# Patient Record
Sex: FEMALE | Race: WHITE | NOT HISPANIC OR LATINO | URBAN - METROPOLITAN AREA
[De-identification: names, ages, dates, MRNs, and addresses within clinical notes are randomized per-mention and may not be internally consistent; named-entity substitution may affect disease eponyms.]

---

## 2024-05-22 ENCOUNTER — APPOINTMENT (OUTPATIENT)
Dept: OBSTETRICS AND GYNECOLOGY | Facility: CLINIC | Age: 39
End: 2024-05-22
Payer: COMMERCIAL

## 2024-06-11 ENCOUNTER — APPOINTMENT (OUTPATIENT)
Dept: PRIMARY CARE | Facility: CLINIC | Age: 39
End: 2024-06-11
Payer: COMMERCIAL

## 2024-06-25 ENCOUNTER — HOSPITAL ENCOUNTER (OUTPATIENT)
Dept: RADIOLOGY | Facility: EXTERNAL LOCATION | Age: 39
Discharge: HOME | End: 2024-06-25

## 2024-06-25 ENCOUNTER — APPOINTMENT (OUTPATIENT)
Dept: PRIMARY CARE | Facility: CLINIC | Age: 39
End: 2024-06-25
Payer: COMMERCIAL

## 2024-06-25 VITALS
TEMPERATURE: 97.2 F | BODY MASS INDEX: 29.81 KG/M2 | HEART RATE: 66 BPM | DIASTOLIC BLOOD PRESSURE: 82 MMHG | SYSTOLIC BLOOD PRESSURE: 125 MMHG | HEIGHT: 68 IN | OXYGEN SATURATION: 96 % | WEIGHT: 196.7 LBS

## 2024-06-25 DIAGNOSIS — Z23 ENCOUNTER FOR IMMUNIZATION: ICD-10-CM

## 2024-06-25 DIAGNOSIS — Z30.44 ENCOUNTER FOR SURVEILLANCE OF VAGINAL RING HORMONAL CONTRACEPTIVE DEVICE: ICD-10-CM

## 2024-06-25 DIAGNOSIS — Z12.31 BREAST CANCER SCREENING BY MAMMOGRAM: ICD-10-CM

## 2024-06-25 DIAGNOSIS — Z00.00 ANNUAL PHYSICAL EXAM: Primary | ICD-10-CM

## 2024-06-25 DIAGNOSIS — D72.819 LEUKOPENIA, UNSPECIFIED TYPE: ICD-10-CM

## 2024-06-25 PROBLEM — L71.9 ROSACEA, UNSPECIFIED: Status: ACTIVE | Noted: 2021-09-15

## 2024-06-25 PROBLEM — E78.5 HYPERLIPIDEMIA: Status: ACTIVE | Noted: 2024-06-25

## 2024-06-25 PROBLEM — D22.5 MELANOCYTIC NEVI OF TRUNK: Status: RESOLVED | Noted: 2021-09-15 | Resolved: 2024-06-25

## 2024-06-25 LAB
NON-UH HIE A/G RATIO: 1.3
NON-UH HIE ALB: 4.1 G/DL (ref 3.4–5)
NON-UH HIE ALK PHOS: 84 UNIT/L (ref 45–117)
NON-UH HIE BILIRUBIN, TOTAL: 0.8 MG/DL (ref 0.3–1.2)
NON-UH HIE BUN/CREAT RATIO: 13.8
NON-UH HIE BUN: 11 MG/DL (ref 9–23)
NON-UH HIE CALCIUM: 9.4 MG/DL (ref 8.7–10.4)
NON-UH HIE CALCULATED LDL CHOLESTEROL: 149 MG/DL (ref 60–130)
NON-UH HIE CALCULATED OSMOLALITY: 279 MOSM/KG (ref 275–295)
NON-UH HIE CHLORIDE: 107 MMOL/L (ref 98–107)
NON-UH HIE CHOLESTEROL: 222 MG/DL (ref 100–200)
NON-UH HIE CO2, VENOUS: 28 MMOL/L (ref 20–31)
NON-UH HIE CREATININE: 0.8 MG/DL (ref 0.5–0.8)
NON-UH HIE GFR AA: >60
NON-UH HIE GLOBULIN: 3.2 G/DL
NON-UH HIE GLOMERULAR FILTRATION RATE: >60 ML/MIN/1.73M?
NON-UH HIE GLUCOSE: 94 MG/DL (ref 74–106)
NON-UH HIE GOT: 17 UNIT/L (ref 15–37)
NON-UH HIE GPT: 12 UNIT/L (ref 10–49)
NON-UH HIE HCT: 39.5 % (ref 36–46)
NON-UH HIE HDL CHOLESTEROL: 54 MG/DL (ref 40–60)
NON-UH HIE HGB: 13.5 G/DL (ref 12–16)
NON-UH HIE INSTR WBC ND: 3.9
NON-UH HIE K: 4.3 MMOL/L (ref 3.5–5.1)
NON-UH HIE MCH: 31.6 PG (ref 27–34)
NON-UH HIE MCHC: 34.2 G/DL (ref 32–37)
NON-UH HIE MCV: 92.6 FL (ref 80–100)
NON-UH HIE MPV: 9 FL (ref 7.4–10.4)
NON-UH HIE NA: 140 MMOL/L (ref 135–145)
NON-UH HIE PLATELET: 193 X10 (ref 150–450)
NON-UH HIE RBC: 4.27 X10 (ref 4.2–5.4)
NON-UH HIE RDW: 12.8 % (ref 11.5–14.5)
NON-UH HIE TOTAL CHOL/HDL CHOL RATIO: 4.1
NON-UH HIE TOTAL PROTEIN: 7.3 G/DL (ref 5.7–8.2)
NON-UH HIE TRIGLYCERIDES: 96 MG/DL (ref 30–150)
NON-UH HIE VIT D 25: 27 NG/ML
NON-UH HIE WBC: 3.9 X10 (ref 4.5–11)

## 2024-06-25 PROCEDURE — 99396 PREV VISIT EST AGE 40-64: CPT | Performed by: NURSE PRACTITIONER

## 2024-06-25 PROCEDURE — 90715 TDAP VACCINE 7 YRS/> IM: CPT | Performed by: NURSE PRACTITIONER

## 2024-06-25 PROCEDURE — 90471 IMMUNIZATION ADMIN: CPT | Performed by: NURSE PRACTITIONER

## 2024-06-25 RX ORDER — ETONOGESTREL AND ETHINYL ESTRADIOL VAGINAL RING .015; .12 MG/D; MG/D
1 RING VAGINAL
COMMUNITY
Start: 2020-07-01 | End: 2024-06-25 | Stop reason: SDUPTHER

## 2024-06-25 RX ORDER — ETONOGESTREL AND ETHINYL ESTRADIOL VAGINAL RING .015; .12 MG/D; MG/D
1 RING VAGINAL
Qty: 1 EACH | Refills: 12 | Status: SHIPPED | OUTPATIENT
Start: 2024-06-25 | End: 2025-06-25

## 2024-06-25 ASSESSMENT — ENCOUNTER SYMPTOMS
WHEEZING: 0
COUGH: 0
SHORTNESS OF BREATH: 0
HEMATURIA: 0
ARTHRALGIAS: 0
CONSTIPATION: 0
PALPITATIONS: 0
FREQUENCY: 0
DYSURIA: 0
ABDOMINAL PAIN: 0
NUMBNESS: 0
VOMITING: 0
DIARRHEA: 0
EYE ITCHING: 0
NERVOUS/ANXIOUS: 0
ABDOMINAL DISTENTION: 0
APPETITE CHANGE: 0
EYE PAIN: 0
WOUND: 0
ACTIVITY CHANGE: 0

## 2024-06-25 NOTE — ASSESSMENT & PLAN NOTE
-Does not smoke, no history of DVTs  Pap 7/2022-- normal pap, negative for hpv  Has GYN appt in September -  Leann Sanchez

## 2024-06-25 NOTE — PROGRESS NOTES
Catalina Pena female   1985 39 y.o.   [id number]    Chief Complaint  Establish Care.    History Of Present Illness  Catalina Pena is a 39 y.o. female presents today to establish care.     Acute concerns today:     Chronic conditions reviewed:   Vaginal Ring contraceptive   Pap 2022-- normal pap, negative for hpv  Has GYN appt in September -  Leann Sanchez       Review of Systems  Review of Systems   Constitutional:  Negative for activity change and appetite change.   HENT:  Negative for congestion.    Eyes:  Negative for pain and itching.   Respiratory:  Negative for cough, shortness of breath and wheezing.    Cardiovascular:  Negative for chest pain, palpitations and leg swelling.   Gastrointestinal:  Negative for abdominal distention, abdominal pain, constipation, diarrhea and vomiting.   Genitourinary:  Negative for dysuria, frequency, hematuria and urgency.   Musculoskeletal:  Negative for arthralgias and gait problem.   Skin:  Negative for rash and wound.   Neurological:  Negative for numbness.   Psychiatric/Behavioral:  The patient is not nervous/anxious.        Diet:  Healthy but larger portions   Exercise: yes regularly (almost daily)   Dental:  yes regularly   Ophtho: Yes for check up-- no issues      Screenings:   Pap- uptd    Mammo-ordered   Colonoscopy-- father w hx of many polyps but not cancerous.    Immunizations:   Flu- usually does not.  Recommended in fall   Tdap- tdap today   Covid- recommended at pharmacy       Past Medical History  She has a past medical history of Melanocytic nevi of trunk (09/15/2021).    Surgical History  She has a past surgical history that includes Other surgical history (2020) and Other surgical history (2020).    Family History  Family History   Problem Relation Name Age of Onset    Hypertension Mother      Atrial fibrillation Father      Anxiety disorder Father      Hypertension Father      Hyperlipidemia Father       labor Son       "Autism Son      Diabetes Maternal Grandmother      Kidney cancer Maternal Grandfather      Other (non hodkin lymphoma) Paternal Grandmother          Social History  She reports that she has never smoked. She has never used smokeless tobacco. She reports current alcohol use of about 2.0 standard drinks of alcohol per week. She reports that she does not use drugs.    DEPRESSION SCREEN       Allergies  Patient has no allergy information on record.    Medications  Current Outpatient Medications   Medication Instructions    etonogestreL-ethinyl estradioL (EluRyng) 0.12-0.015 mg/24 hr vaginal ring 1 each, vaginal, Every 28 days        Objective   /82   Pulse 66   Temp 36.2 °C (97.2 °F)   Ht 1.727 m (5' 8\")   Wt 89.2 kg (196 lb 11.2 oz)   SpO2 96%   BMI 29.91 kg/m²    BMI: Estimated body mass index is 29.91 kg/m² as calculated from the following:    Height as of this encounter: 1.727 m (5' 8\").    Weight as of this encounter: 89.2 kg (196 lb 11.2 oz).    Physical Exam  Physical Exam  Vitals and nursing note reviewed.   Constitutional:       Appearance: Normal appearance.   HENT:      Head: Normocephalic and atraumatic.      Nose: Nose normal.      Mouth/Throat:      Mouth: Mucous membranes are moist.      Pharynx: Oropharynx is clear.   Eyes:      Extraocular Movements: Extraocular movements intact.      Conjunctiva/sclera: Conjunctivae normal.      Pupils: Pupils are equal, round, and reactive to light.   Cardiovascular:      Rate and Rhythm: Normal rate and regular rhythm.      Pulses: Normal pulses.      Heart sounds: Normal heart sounds.   Pulmonary:      Effort: Pulmonary effort is normal.      Breath sounds: Normal breath sounds.   Abdominal:      General: Bowel sounds are normal.      Palpations: Abdomen is soft.      Tenderness: There is no abdominal tenderness.   Musculoskeletal:         General: Normal range of motion.      Cervical back: Neck supple.   Skin:     General: Skin is warm and dry. "   Neurological:      General: No focal deficit present.      Mental Status: She is alert and oriented to person, place, and time. Mental status is at baseline.   Psychiatric:         Mood and Affect: Mood normal.         Behavior: Behavior normal.         Thought Content: Thought content normal.         Judgment: Judgment normal.         Relevant Results and Imaging  Orders Only on 06/25/2024   Component Date Value Ref Range Status    [facility] HCT 06/25/2024 39.5  36.0 - 46.0 % Final    [facility] RBC 06/25/2024 4.27  4.20 - 5.40 x10 Final    Note: RBC morphology is normal unless otherwise stated.  Evaluation performed only if differential is requested.    [facility] Platelet 06/25/2024 193  150 - 450 x10 Final    [facility] Instr WBC ND 06/25/2024 3.9   Final    [facility] MCHC 06/25/2024 34.2  32.0 - 37.0 g/dL Final    [facility] MCV 06/25/2024 92.6  80.0 - 100.0 fL Final    [facility] MPV 06/25/2024 9.0  7.4 - 10.4 fL Final    [facility] HGB 06/25/2024 13.5  12.0 - 16.0 g/dL Final    [facility] WBC 06/25/2024 3.9 (L)  4.5 - 11.0 x10 Final    [facility] RDW 06/25/2024 12.8  11.5 - 14.5 % Final    [facility] MCH 06/25/2024 31.6  27.0 - 34.0 pg Final    [facility] Vit D 25 06/25/2024 27  ng/mL Final    Comment: Less than 20 ng/mL  Deficient  20 ? 30 ng/mL   Insufficient  30 ? 100 ng/mL   Sufficiency  Greater than 100 ng/mL Potential Toxicity      [facility] BUN 06/25/2024 11  9 - 23 mg/dL Final    Comment: -  Venipuncture should occur prior to N-Acetyl Cysteine (NAC) or Metamizole (Sulpyrine) administration due to the potential for falsely depressed results.  -  Blood samples from some patients with monoclonal gammopathies may produce falsely elevated results      [facility] Calcium 06/25/2024 9.4  8.7 - 10.4 mg/dL Final    [facility] GOT 06/25/2024 17  15 - 37 unit/L Final    [facility] ALB 06/25/2024 4.1  3.4 - 5.0 g/dL Final    [facility] Glucose 06/25/2024 94  74 - 106 mg/dL Final    [facility] GFR AA  06/25/2024 >60   Final    Comment:  GFR CalcMedical judgement is necessary to interpret GFR.  The calculated GFR may not accurately reflect renal status in patients >70 years, pregnant women, acutely ill hospitalized patients and patients with acute renal failure or known renal disease.  The MDRD GFR formula is valid only for adults greater than 18 years of age.  Note:  Creatinine clearance (not GFR) should be used for drug dosing.      [facility] Bilirubin, Total 06/25/2024 0.80  0.30 - 1.20 mg/dL Final    Use of this assay is not recommended for patients undergoing treatment with eltrombopag due to the potential for falsely elevated results.    [facility] Chloride 06/25/2024 107  98 - 107 mmol/L Final    [facility] A/G Ratio 06/25/2024 1.3   Final    [facility] BUN/Creat Ratio 06/25/2024 13.8   Final    [facility] Na 06/25/2024 140  135 - 145 mmol/L Final    [facility] GPT 06/25/2024 12  10 - 49 unit/L Final    [facility] Alk Phos 06/25/2024 84  45 - 117 unit/L Final    [facility] Creatinine 06/25/2024 0.8  0.5 - 0.8 mg/dL Final    [facility] Total Protein 06/25/2024 7.3  5.7 - 8.2 g/dL Final    Total Protein results may be increased in patients receiving dextran as a blood volume expander    [facility] CO2, venous 06/25/2024 28.0  20.0 - 31.0 mmol/L Final    [facility] Glomerular Filtration R* 06/25/2024 >60  mL/min/1.73m? Final    Comment: Non- GFR CalcMedical judgement is necessary to interpret GFR.  The calculated GFR may not accurately reflect renal status in patients >70 years, pregnant women, acutely ill hospitalized patients and patients with acute renal failure or known renal disease.  The MDRD GFR formula is valid only for adults greater than 18 years of age.  Note:  Creatinine clearance (not GFR) should be used for drug dosing.      [facility] Calculated Osmolality 06/25/2024 279  275 - 295 mOsm/kg Final    [facility] K 06/25/2024 4.3  3.5 - 5.1 mmol/L Final    [facility]  Globulin 06/25/2024 3.2  g/dL Final    [facility] Calculated LDL Choleste* 06/25/2024 149 (H)  60 - 130 mg/dL Final    Comment: <100 mg/dl Optimal  100-129 mg/dl Near Optimal  130-159 mg/dl Borderline High  160-189 mg/dl High  >=190 mg/dl Very High      [facility] HDL Cholesterol 06/25/2024 54  40 - 60 mg/dL Final    Direct HDL Venipuncture should occur prior to metamizole (sulpyrine) administration due to the potential for falsely depressed results    [facility] Total Chol/HDL Chol Rat* 06/25/2024 4.1   Final    [facility] Triglycerides 06/25/2024 96  30 - 150 mg/dL Final    Comment: -  Venipuncture should occur prior to N-Acetyl Cysteine (NAC) or Metamizole (Sulpyrine) administration due to the potential for falsely depressed results  -  Use of this assay is not recommended for patients being treated with etamsylate because it causes falsely decreased results      [facility] Cholesterol 06/25/2024 222 (H)  100 - 200 mg/dL Final    Venipuncture should occur prior to N-Acetyl Cysteine (NAC) or Metamizole (Sulpyrine) administration due to the potential for falsely depressed results.     No images are attached to the encounter.        Assessment and Plan  Assessment/Plan   Problem List Items Addressed This Visit    None  Visit Diagnoses         Codes    Breast cancer screening by mammogram    -  Primary Z12.31    Relevant Orders    BI mammo bilateral screening tomosynthesis (Completed)    Encounter for surveillance of vaginal ring hormonal contraceptive device     Z30.44    Relevant Medications    etonogestreL-ethinyl estradioL (EluRyng) 0.12-0.015 mg/24 hr vaginal ring    Annual physical exam     Z00.00    Relevant Orders    Lipid Panel    Comprehensive Metabolic Panel    Vitamin D 25-Hydroxy,Total (for eval of Vitamin D levels)    Leukopenia, unspecified type     D72.819    Relevant Orders    CBC    Encounter for immunization     Z23    Relevant Orders    Lipid Panel    Tdap vaccine, age 7 years and older (ADACEL)  (Completed)

## 2024-08-22 ENCOUNTER — HOSPITAL ENCOUNTER (OUTPATIENT)
Dept: RADIOLOGY | Facility: CLINIC | Age: 39
End: 2024-08-22
Payer: COMMERCIAL

## 2024-08-22 DIAGNOSIS — Z12.31 SCREENING MAMMOGRAM FOR BREAST CANCER: ICD-10-CM

## 2024-08-27 ENCOUNTER — APPOINTMENT (OUTPATIENT)
Dept: PRIMARY CARE | Facility: CLINIC | Age: 39
End: 2024-08-27
Payer: COMMERCIAL

## 2024-08-28 ENCOUNTER — APPOINTMENT (OUTPATIENT)
Dept: DERMATOLOGY | Facility: CLINIC | Age: 39
End: 2024-08-28
Payer: COMMERCIAL

## 2024-08-29 ENCOUNTER — APPOINTMENT (OUTPATIENT)
Dept: OBSTETRICS AND GYNECOLOGY | Facility: CLINIC | Age: 39
End: 2024-08-29
Payer: COMMERCIAL

## 2024-08-29 VITALS
HEIGHT: 68 IN | BODY MASS INDEX: 29.4 KG/M2 | SYSTOLIC BLOOD PRESSURE: 132 MMHG | WEIGHT: 194 LBS | DIASTOLIC BLOOD PRESSURE: 82 MMHG

## 2024-08-29 DIAGNOSIS — Z13.29 SCREENING FOR THYROID DISORDER: Primary | ICD-10-CM

## 2024-08-29 DIAGNOSIS — Z01.419 WELL WOMAN EXAM: ICD-10-CM

## 2024-08-29 PROCEDURE — 1036F TOBACCO NON-USER: CPT | Performed by: ADVANCED PRACTICE MIDWIFE

## 2024-08-29 PROCEDURE — 3008F BODY MASS INDEX DOCD: CPT | Performed by: ADVANCED PRACTICE MIDWIFE

## 2024-08-29 PROCEDURE — 99386 PREV VISIT NEW AGE 40-64: CPT | Performed by: ADVANCED PRACTICE MIDWIFE

## 2024-08-29 RX ORDER — BISMUTH SUBSALICYLATE 262 MG
1 TABLET,CHEWABLE ORAL DAILY
COMMUNITY

## 2024-08-29 ASSESSMENT — PAIN SCALES - GENERAL: PAINLEVEL: 0-NO PAIN

## 2024-08-29 NOTE — PROGRESS NOTES
"RONI ( Breast and Pelvic exam )    Last pap: 7.1.2020 ( WNL -HPV )  Last mammo: Scheduled 9.12.24      Chaperone declined: Brenda Goodman, [id number]    Assessment/Plan   Problem List Items Addressed This Visit             ICD-10-CM       Medium    Screening for thyroid disorder - Primary Z13.29    Relevant Orders    TSH with reflex to Free T4 if abnormal    Well woman exam Z01.419     Pap due 1 year  Mammogram scheduled             Leann SHASHI Hicks, APRN-CNM     Subjective   Catalina Pena is a 39 y.o. female who is here for a routine exam.     New patient, last visit 2021 Dr. Waldron     Due for pap in 2025  Mammogram is scheduled     Reports recently starting Nuva ring, using continuously, happy with method  However, also experiencing hot flashes. More recent, wakes up at night     Lives with:  and 2 kids   Current employment: homemaker  T/E/D: never tobacco/social ETOH/denies   Up to date vision/dental: yes  PCP:  Cintia Dye   Menstrual history: started on Nuva Ring in June, continuous cycling   Sexual history: monogamous male partner x 22 years  Denies DV   Pregnancy history:  G/P 3/2  T: 2 P:  EAB:   Ectopic:   SAB: 1  L:  2    Diet: trying to be healthy   Exercise: daily     PMH, PSH, and Family hx reviewed and updated    Current contraception: NuvaRing vaginal inserts  Refill Y/N:    Last pap: 2020  History of abnormal Pap smear: no  Family history of breast, uterine,  ovarian cancer: no  Regular self breast exam: no  Last mammogram: scheduled next month   History of abnormal mammogram: no      Review of Systems    Objective   /82   Ht 1.727 m (5' 8\")   Wt 88 kg (194 lb)   LMP  (LMP Unknown)   BMI 29.50 kg/m²     Physical Exam  Constitutional:       Appearance: Normal appearance.   HENT:      Head: Normocephalic.   Neck:      Thyroid: No thyroid mass, thyromegaly or thyroid tenderness.   Cardiovascular:      Rate and Rhythm: Normal rate and regular rhythm.   Pulmonary:      Effort: " Pulmonary effort is normal.      Breath sounds: Normal breath sounds.   Chest:   Breasts:     Right: Normal. No mass, nipple discharge or tenderness.      Left: Normal. No mass, nipple discharge or tenderness.   Abdominal:      Palpations: Abdomen is soft.   Genitourinary:     General: Normal vulva.   Musculoskeletal:         General: Normal range of motion.   Lymphadenopathy:      Upper Body:      Right upper body: No axillary adenopathy.      Left upper body: No axillary adenopathy.   Skin:     General: Skin is warm and dry.   Neurological:      Mental Status: She is alert and oriented to person, place, and time.   Psychiatric:         Mood and Affect: Mood normal.

## 2024-09-03 ENCOUNTER — LAB (OUTPATIENT)
Dept: LAB | Facility: LAB | Age: 39
End: 2024-09-03
Payer: COMMERCIAL

## 2024-09-03 DIAGNOSIS — Z13.29 SCREENING FOR THYROID DISORDER: ICD-10-CM

## 2024-09-03 LAB — TSH SERPL-ACNC: 0.83 MIU/L (ref 0.44–3.98)

## 2024-09-03 PROCEDURE — 84443 ASSAY THYROID STIM HORMONE: CPT

## 2024-09-03 PROCEDURE — 36415 COLL VENOUS BLD VENIPUNCTURE: CPT

## 2024-09-04 ENCOUNTER — APPOINTMENT (OUTPATIENT)
Dept: OBSTETRICS AND GYNECOLOGY | Facility: CLINIC | Age: 39
End: 2024-09-04
Payer: COMMERCIAL

## 2024-09-05 ENCOUNTER — APPOINTMENT (OUTPATIENT)
Dept: DERMATOLOGY | Facility: CLINIC | Age: 39
End: 2024-09-05
Payer: COMMERCIAL

## 2024-09-12 ENCOUNTER — HOSPITAL ENCOUNTER (OUTPATIENT)
Dept: RADIOLOGY | Facility: CLINIC | Age: 39
Discharge: HOME | End: 2024-09-12
Payer: COMMERCIAL

## 2024-09-12 VITALS — WEIGHT: 194 LBS | BODY MASS INDEX: 29.4 KG/M2 | HEIGHT: 68 IN

## 2024-09-12 DIAGNOSIS — Z12.31 ENCOUNTER FOR SCREENING MAMMOGRAM FOR MALIGNANT NEOPLASM OF BREAST: ICD-10-CM

## 2024-09-12 PROCEDURE — 77067 SCR MAMMO BI INCL CAD: CPT
